# Patient Record
Sex: MALE | NOT HISPANIC OR LATINO | Employment: FULL TIME
[De-identification: names, ages, dates, MRNs, and addresses within clinical notes are randomized per-mention and may not be internally consistent; named-entity substitution may affect disease eponyms.]

---

## 2021-12-25 ENCOUNTER — HEALTH MAINTENANCE LETTER (OUTPATIENT)
Age: 46
End: 2021-12-25

## 2022-07-04 ENCOUNTER — TELEPHONE (OUTPATIENT)
Dept: NURSING | Facility: CLINIC | Age: 47
End: 2022-07-04

## 2022-07-04 ENCOUNTER — VIRTUAL VISIT (OUTPATIENT)
Dept: URGENT CARE | Facility: CLINIC | Age: 47
End: 2022-07-04
Payer: COMMERCIAL

## 2022-07-04 DIAGNOSIS — U07.1 CLINICAL DIAGNOSIS OF COVID-19: Primary | ICD-10-CM

## 2022-07-04 PROCEDURE — 99213 OFFICE O/P EST LOW 20 MIN: CPT | Mod: CS

## 2022-07-04 NOTE — PATIENT INSTRUCTIONS
"  Instructions for Patients  Your COVID-19 test was positive. This means you have the virus. Please follow the \"How can I take care of myself\" and \"How do I self-isolate?\" guidelines in these instructions.    What treatments are available?  Over-the-counter medicines may help with your symptoms such as runny or stuffy nose, cough, chills, and fever. Talk to your care team about your options.     Some people are at high risk for severe illness (for example if you have a weak immune system, you're 65 or older, or you have certain medical problems). If your risk it high and your symptoms started in the last 5 to 7 days, we strongly recommend for you to get COVID treatment as soon as possible before you get really sick. Paxlovid, Molnupiravir and the monoclonal antibody treatments are proven safe and effective, make you feel better faster, and prevent hospitalization and death.       You can schedule an appointment to discuss COVID treatment by requesting an appointment on [employer] by selecting \"schedule COVID-19 Treatment\" or by calling [employer] ([phone]) and pressing 7.    What are the symptoms of COVID-19?  Symptoms can include fever, cough, shortness of breath, chills, headache, muscle pain sore throat, fatigue, runny or stuffy nose, and loss of taste and smell. Other less common symptoms include nausea, vomiting, or diarrhea (watery stools).    Know when to call 911. Emergency warning signs include:    Trouble breathing or shortness of breath    Pain or pressure in the chest that doesn't go away    Feeling confused like you haven't felt before, or not being able to wake up    Bluish-colored lips or face    How can I take care of myself?  1. Get lots of rest. Drink extra fluids (unless a doctor has told you not to).  2. Take Tylenol (acetaminophen) for fever or pain. If you have liver or kidney problems, ask your family doctor if it's okay to take Tylenol   Adults:   650 mg (two 325 mg pills or tablets) " every 4 to 6 hours, or...   1,000 mg (two 500 mg pills or tablets) every 8 hours as needed.  Note: Don't take more than 3,000 mg in one day. Acetaminophen is found in many medicines (both prescribed and over the counter). Read all labels to be sure you don't take too much.  For children, check the Tylenol bottle for the right dose. The dose is based on the child's age or weight.  3. Take over the counter medicines for your symptoms as needed. Talk to your pharmacist.  4. If you have other health problems (like cancer, heart failure, an organ transplant, or severe kidney disease): Call your specialty clinic if you don't feel better in the next 2 days.    These guidelines are for isolating and quarantining before returning to work, school or .     For employers, schools and day cares: This is an official notice for this person s medical guidelines for returning in-person.     For health care sites: The CDC gives different isolation and quarantine guidelines for healthcare sites, please check with these sites before arriving.     How do I self-isolate?  You isolate when you have symptoms of COVID or a test shows you have COVID, even if you don t have symptoms.     If you DO have symptoms:  o Stay home and away from others  - For at least 5 days after your symptoms started, AND   - You are fever free for 24 hours (with no medicine that reduces fever), AND  - Your other symptoms are better.  o Wear a mask for 10 full days any time you are around others.    If you DON T have symptoms:  o Stay at home and away from others for at least 5 days after your positive test.  o Wear a mask for 10 full days any time you are around others.    How and when do I quarantine?  You quarantine when you may have been exposed to the virus and DON T have symptoms.     Stay home and away from others.     You must quarantine for 5 days after your last contact with a person who has COVID.  o Note: If you are fully vaccinated, you don t  need to quarantine. You should still follow the steps below.     Wear a mask for 10 full days any time you re around others.    Get tested at least 5 days after you were exposed, even if you don t have symptoms.     If you start to have symptoms, isolate right away and get tested.    Where can I get more information?    [facility] COVID-19 Resource Hub: [email or web address]     CDC Quarantine & Isolation: https://www.cdc.gov/coronavirus/2019-ncov/your-health/quarantine-isolation.html     CDC - What to Do If You're Sick: https://www.cdc.gov/coronavirus/2019-ncov/if-you-are-sick/index.html    [facility] clinical trials (COVID-19 research studies): clinicalaffairs.[facility]/umn-clinical-trials    [place] Department of Health COVID-19 Public Hotline: 1-894.887.3384

## 2022-07-04 NOTE — TELEPHONE ENCOUNTER
Coronavirus (COVID-19) Notification    Caller Name (Patient, parent, daughter/son, grandparent, etc)  Patient calling       Gather patient reported symptoms   Assessment   Current Symptoms at time of phone call, reported by patient: (if no symptoms, document No symptoms] Cough, fever, body aches, productive cough   Date of Symptom(s) onset (if applicable) 7/3/2022       Monoclonal Antibody Administration    You may be eligible to receive a new treatment with a monoclonal antibody for preventing hospitalization in patients at high risk for complications from COVID-19. This medication is still experimental and available on a limited basis; it is given through an IV and must be given at an infusion center. Please note that not all people who are eligible will receive the medication since it is in limited supply.  Is the patient symptomatic and onset of symptoms within the last 7 days?  Yes  Is the patient interested in a visit with a provider to discuss treatment options?: Yes  Is the patient seen at [facility]?  No: Warm transfer caller to [phone] to be scheduled with a virtual urgent provider.  During transfer, instruct  on appropriate time frame for visit     Review information with Patient    Your result was positive. This means you have COVID-19 (coronavirus).      How can I protect others?    These guidelines are for isolating before returning to work, school or .       If you DO have symptoms:  o Stay home and away from others  - For at least 5 days after your symptoms started, AND   - You are fever free for 24 hours (with no medicine that reduces fever), AND  - Your other symptoms are better.  o Wear a mask for 10 full days any time you are around others.    If you DON'T have symptoms:  o Stay at home and away from others for at least 5 days after your positive test.  o Wear a mask for 10 full days any time you are around others.    There may be different guidelines for  healthcare facilities. Please check with the specific sites before arriving.     If you've been told by a doctor that you were severely ill with COVID-19 or are immunocompromised, you should isolate for at least 10 days.    You should not go back to work until you meet the guidelines above for ending your home isolation. You don't need to be retested for COVID-19 before going back to work--studies show that you won't spread the virus if it's been at least 10 days since your symptoms started (or 20 days, if you have a weak immune system).    Employers, schools, and daycares: This is an official notice for this person's medical guidelines for returning in-person. They must meet the above guidelines before going back to work, school, or  in person.    You will receive a positive COVID-19 letter via [employer] or the mail soon with additional self-care information (exception, no letters sent to presurgical/preprocedure patients).     Would you like me to review some of that information with you now?  No    Patient will call back with worsening symptoms.   Connected with scheduling.       Lizzeth Jolly RN    COVID 19 Nurse Triage Plan/Patient Instructions    Please be aware that novel coronavirus (COVID-19) may be circulating in the community. If you develop symptoms such as fever, cough, or SOB or if you have concerns about the presence of another infection including coronavirus (COVID-19), please contact your health care provider or visit [email or web address].     Disposition/Instructions    Virtual Visit with provider recommended. Reference Visit Selection Guide.    Thank you for taking steps to prevent the spread of this virus.  o Limit your contact with others.  o Wear a simple mask to cover your cough.  o Wash your hands well and often.    Resources    [facility]: About COVID-19: [email or web address]    CDC: What to Do If You're Sick:  www.cdc.gov/coronavirus/2019-ncov/about/steps-when-sick.html    CDC: Ending Home Isolation: www.cdc.gov/coronavirus/2019-ncov/hcp/disposition-in-home-patients.html     CDC: Caring for Someone: www.cdc.gov/coronavirus/2019-ncov/if-you-are-sick/care-for-someone.html     [facility]: Interim Guidance for Hospital Discharge to Home: www.[facility].[place]./diseases/coronavirus/hcp/hospdischarge.pdf    [facility] clinical trials (COVID-19 research studies): clinicalaffairs.[facility]-clinical-trials     Below are the COVID-19 hotlines at the [place] Department of Health ([facility]). Interpreters are available.   o For health questions: Call 322-260-1638 or 1-100.367.6476 (7 a.m. to 7 p.m.)  o For questions about schools and childcare: Call 658-032-5119 or 1-853.379.6728 (7 a.m. to 7 p.m.)

## 2022-07-04 NOTE — PROGRESS NOTES
"Yao is a 46 year old who is being evaluated via a billable video visit.          Assessment & Plan     Clinical diagnosis of COVID-19    - nirmatrelvir and ritonavir (PAXLOVID) therapy pack; Take 3 tablets by mouth 2 times daily for 5 days Take 2 Nirmatrelvir tablets and 1 Ritonavir tablet twice daily for 5 days.    BMI 30.0-30.9,adult          20 minutes spent on the date of the encounter doing chart review, patient visit and documentation        COVID-19 positive patient.  Encounter for consideration of medication intervention. Patient does qualify for a prescription. Full discussion with patient including medication options, risks and benefits. Potential drug interactions reviewed with patient.     Treatment Planned [facility]axlovid sent to [facility] [place]    Temporary change to home medications:  None     Estimated body mass index is 28.67 kg/m  as calculated from the following:    Height as of 7/23/07: 1.695 m (5' 6.75\").    Weight as of 7/23/07: 82.4 kg (181 lb 11.2 oz).  GFR Estimate   Date Value Ref Range Status   07/23/2007 75 >60 mL/min/1.7m2 Final     No results found for: [id number]    Return in about 5 days (around 7/9/2022), or if symptoms worsen or fail to improve.    Virtual Urgent Care  [facility] VIRTUAL URGENT CARE    Subjective   Yao is a 46 year old, presenting for the following health issues:  No chief complaint on file.      HPI       COVID-19 Symptom Review  How many days ago did these symptoms start? 1 day ago    Are any of the following symptoms significant for you?    New or worsening difficulty breathing? No    Worsening cough? Yes, it's a dry cough.     Fever or chills? No    Headache: YES    Sore throat: no    Chest pain: no    Diarrhea: no    Body aches? YES    What treatments has patient tried? none   Does patient live in a nursing home, group home, or shelter? no  Does patient have a way to get food/medications during quarantined? Yes, I have a friend or family member " who can help me.      Review of Systems   Constitutional, HEENT, cardiovascular, pulmonary, gi and gu systems are negative, except as otherwise noted.      Objective           Vitals:  No vitals were obtained today due to virtual visit.    Physical Exam   GENERAL: Healthy, alert and no distress  RESP: No audible wheeze, cough, or visible cyanosis.  No visible retractions or increased work of breathing.    PSYCH: Mentation appears normal, affect normal/bright, judgement and insight intact, normal speech and appearance well-groomed.            Video-Visit Details    Video Start Time: 1750    Type of service:  Video Visit    Video End Time:1800    Originating Location (pt. Location): Home    Distant Location (provider location):  [facility] VIRTUAL URGENT CARE     Platform used for Video Visit: [employer]    .  ..

## 2022-10-22 ENCOUNTER — HEALTH MAINTENANCE LETTER (OUTPATIENT)
Age: 47
End: 2022-10-22

## 2023-04-01 ENCOUNTER — HEALTH MAINTENANCE LETTER (OUTPATIENT)
Age: 48
End: 2023-04-01

## 2023-05-01 ENCOUNTER — OFFICE VISIT (OUTPATIENT)
Dept: PEDIATRICS | Facility: CLINIC | Age: 48
End: 2023-05-01
Payer: COMMERCIAL

## 2023-05-01 ENCOUNTER — NURSE TRIAGE (OUTPATIENT)
Dept: PEDIATRICS | Facility: CLINIC | Age: 48
End: 2023-05-01

## 2023-05-01 VITALS
RESPIRATION RATE: 18 BRPM | SYSTOLIC BLOOD PRESSURE: 116 MMHG | HEART RATE: 78 BPM | DIASTOLIC BLOOD PRESSURE: 80 MMHG | HEIGHT: 66 IN | BODY MASS INDEX: 31.07 KG/M2 | TEMPERATURE: 97.8 F | OXYGEN SATURATION: 98 % | WEIGHT: 193.3 LBS

## 2023-05-01 DIAGNOSIS — S06.0X0A CONCUSSION WITHOUT LOSS OF CONSCIOUSNESS, INITIAL ENCOUNTER: Primary | ICD-10-CM

## 2023-05-01 DIAGNOSIS — L29.9 EAR ITCHING: ICD-10-CM

## 2023-05-01 DIAGNOSIS — M54.12 CERVICAL RADICULOPATHY: ICD-10-CM

## 2023-05-01 PROCEDURE — 99213 OFFICE O/P EST LOW 20 MIN: CPT | Performed by: PHYSICIAN ASSISTANT

## 2023-05-01 ASSESSMENT — PAIN SCALES - GENERAL: PAINLEVEL: MILD PAIN (2)

## 2023-05-01 NOTE — LETTER
May 1, 2023      Yao Gomez  [address] [place] [id number]        To Whom It May Concern:    Yao Gomez  was seen on 5/1/23.  Please excuse him 5/3/23 due to injury.        Sincerely,        Cabrera Johnston PA-C

## 2023-05-01 NOTE — PROGRESS NOTES
"  Assessment & Plan     Concussion without loss of consciousness, initial encounter  Does not meet criteria for imaging. Signs for emergent evaluation discussed with patient.   Brain rest, physical rest.   Work excuse given.    Cervical radiculopathy  Tylenol. Heat. Call if symptoms persist.     Ear itching  Apply hydrocortisone twice daily.     NEDRA Romano [facility] TRACI Samayoa is a 47 year old, presenting for the following health issues:      HPI   Injury occurred yesterday. Wearing a helmet. No LOC, no amnesia.   Was \"in a fog\" for 10 min. Clarity returned.   Still has a headache today--frontal.   No vision changes. No hearing changes.   No difficulty concentrating.   No nausea or vomiting.   Neck pain--posterior right. Able to move neck. No numbness, tingling, weakness.     Pain History:  When did you first notice your pain? 4/30/23  Have you seen anyone else for your pain? Yes   How has your pain affected your ability to work? Not applicable  Where in your body do you have pain? Headache  Onset/Duration: 1 day -ongoing Patient fell while playing hockey   Description Possible Concussion   Location: bilateral in the frontal area   Character: dull pain  Duration:  1 Day   Wake with headaches: YES  Able to do daily activities when headache present: YES  Intensity:  mild  Progression of Symptoms:  same  Accompanying signs and symptoms:  Stiff neck: YES  Neck or upper back pain: YES  Sinus or URI symptoms no   Fever: No  Nausea or vomiting: No  Dizziness: No  Numbness/tingling: No  Weakness: No  Visual changes: none  History  Head trauma: YES - Patient was playing hockey and fell and hit back of his head  Family history of migraines: No  Daily pain medication use: No  Previous tests for headaches: No  Neurologist evaluation: No  Precipitating or Alleviating factors (light/sound/sleep/caffeine): N/A  Therapies tried and outcome: none        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        Review of Systems   Constitutional, HEENT, cardiovascular, pulmonary, gi and gu systems are negative, except as otherwise noted.      Objective    /80 (BP Location: Right arm, Patient Position: Sitting, Cuff Size: Adult Large)   Pulse 78   Temp 97.8  F (36.6  C) (Tympanic)   Resp 18   Ht 1.676 m (5' 6\")   Wt 87.7 kg (193 lb 4.8 oz)   SpO2 98%   BMI 31.20 kg/m    Body mass index is 31.2 kg/m .  Physical Exam   GENERAL: alert and no distress  EYES: Eyes grossly normal to inspection, PERRL and conjunctivae and sclerae normal  HENT: ear canals --erythema and scaling; and TM's normal, nose and mouth without ulcers or lesions  NECK: no adenopathy, tender over the right medial scapula. Full ROM and strength.   RESP: lungs clear to auscultation - no rales, rhonchi or wheezes  CV: regular rate and rhythm, normal S1 S2, no S3 or S4  Neuro:  Normal strength and tone, mentation intact, speech normal and cranial nerves 2-12 intact    No results found for any visits on 05/01/23.      "

## 2023-05-01 NOTE — TELEPHONE ENCOUNTER
"S: Patient calling for an appointment today    B: Patient was playing hockey last night, fell backwards and hit his head. He was wearing a helmet. He reports he did not black out, he skated off to the bench and zoned out (but alert) for about 10 minutes.     A: Denies LOC, vomiting. Has some neck pain as well.    R: Scheduled patient for appointment today with same day provider. Patient will arrive at 11:10 am.    FLORENCE Henderson on 5/1/2023 at 9:12 AM      Reason for Disposition    Patient wants to be seen    Answer Assessment - Initial Assessment Questions  1. MECHANISM: \"How did the injury happen?\" For falls, ask: \"What height did you fall from?\" and \"What surface did you fall against?\"       hockey  2. ONSET: \"When did the injury happen?\" (Minutes or hours ago)       About 815-830 pm last night  3. NEUROLOGIC SYMPTOMS: \"Was there any loss of consciousness?\" \"Are there any other neurological symptoms?\"       Has a slight headache, denies confusion, vomiting  4. MENTAL STATUS: \"Does the person know who they are, who you are, and where they are?\"       Yes he was and is AOx3  5. LOCATION: \"What part of the head was hit?\"       Parietal scalp  6. SCALP APPEARANCE: \"What does the scalp look like? Is it bleeding now?\" If Yes, ask: \"Is it difficult to stop?\"       No cuts  7. SIZE: For cuts, bruises, or swelling, ask: \"How large is it?\" (e.g., inches or centimeters)       NA  8. PAIN: \"Is there any pain?\" If Yes, ask: \"How bad is it?\"  (e.g., Scale 1-10; or mild, moderate, severe)      1-2 pain level, more annoying than painful and may be his neck that is causing it  9. TETANUS: For any breaks in the skin, ask: \"When was the last tetanus booster?\"      NA  10. OTHER SYMPTOMS: \"Do you have any other symptoms?\" (e.g., neck pain, vomiting)        Neck pain yes, denies vomiting and any other neurological symptoms  11. PREGNANCY: \"Is there any chance you are pregnant?\" \"When was your last menstrual period?\"        NA    Protocols " used: HEAD INJURY-A-OH

## 2024-06-02 ENCOUNTER — HEALTH MAINTENANCE LETTER (OUTPATIENT)
Age: 49
End: 2024-06-02

## 2025-06-14 ENCOUNTER — HEALTH MAINTENANCE LETTER (OUTPATIENT)
Age: 50
End: 2025-06-14